# Patient Record
Sex: FEMALE | Race: WHITE | NOT HISPANIC OR LATINO | Employment: UNEMPLOYED | URBAN - METROPOLITAN AREA
[De-identification: names, ages, dates, MRNs, and addresses within clinical notes are randomized per-mention and may not be internally consistent; named-entity substitution may affect disease eponyms.]

---
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
## 2024-05-17 ENCOUNTER — PATIENT MESSAGE (OUTPATIENT)
Dept: FAMILY MEDICINE CLINIC | Facility: CLINIC | Age: 19
End: 2024-05-17

## 2024-05-29 ENCOUNTER — TELEPHONE (OUTPATIENT)
Dept: FAMILY MEDICINE CLINIC | Facility: CLINIC | Age: 19
End: 2024-05-29

## 2024-06-17 ENCOUNTER — OFFICE VISIT (OUTPATIENT)
Dept: FAMILY MEDICINE CLINIC | Facility: CLINIC | Age: 19
End: 2024-06-17
Payer: COMMERCIAL

## 2024-06-17 VITALS
BODY MASS INDEX: 29.71 KG/M2 | OXYGEN SATURATION: 97 % | TEMPERATURE: 98.4 F | SYSTOLIC BLOOD PRESSURE: 108 MMHG | DIASTOLIC BLOOD PRESSURE: 64 MMHG | HEART RATE: 89 BPM | WEIGHT: 174 LBS | HEIGHT: 64 IN

## 2024-06-17 DIAGNOSIS — F41.9 ANXIETY: ICD-10-CM

## 2024-06-17 DIAGNOSIS — Z13.0 SCREENING FOR DEFICIENCY ANEMIA: ICD-10-CM

## 2024-06-17 DIAGNOSIS — Z13.29 SCREENING FOR THYROID DISORDER: ICD-10-CM

## 2024-06-17 DIAGNOSIS — R35.89 POLYURIA: ICD-10-CM

## 2024-06-17 DIAGNOSIS — Z11.59 ENCOUNTER FOR HEPATITIS C SCREENING TEST FOR LOW RISK PATIENT: ICD-10-CM

## 2024-06-17 DIAGNOSIS — Z13.220 SCREENING FOR LIPID DISORDERS: ICD-10-CM

## 2024-06-17 DIAGNOSIS — Z00.00 ANNUAL PHYSICAL EXAM: Primary | ICD-10-CM

## 2024-06-17 PROCEDURE — 99395 PREV VISIT EST AGE 18-39: CPT | Performed by: NURSE PRACTITIONER

## 2024-06-17 RX ORDER — ESCITALOPRAM OXALATE 5 MG/1
15 TABLET ORAL DAILY
Qty: 30 TABLET | Refills: 0 | Status: SHIPPED | OUTPATIENT
Start: 2024-06-17

## 2024-06-17 NOTE — ASSESSMENT & PLAN NOTE
Follows regularly with Samuel  Has anxiety slightly worsened  Continues on Lexapro 15mg daily  Encouraged to follow up due to panic attacks  She is working with a therapist

## 2024-06-17 NOTE — PROGRESS NOTES
Adult Annual Physical  Name: Antonietta Mg      : 2005      MRN: [id number]  Encounter Provider: KATH Mead  Encounter Date: 2024   Encounter department: [facility]    Assessment & Plan   1. Annual physical exam  2. Screening for lipid disorders  -     Lipid Panel with Direct LDL reflex; Future  -     Lipid Panel with Direct LDL reflex  3. Screening for thyroid disorder  -     TSH, 3rd generation with Free T4 reflex; Future  -     TSH, 3rd generation with Free T4 reflex  4. Screening for deficiency anemia  -     Comprehensive metabolic panel; Future  -     CBC and differential; Future  -     Comprehensive metabolic panel  -     CBC and differential  5. Anxiety  Assessment & Plan:  Follows regularly with Samuel  Has anxiety slightly worsened  Continues on Lexapro 15mg daily  Encouraged to follow up due to panic attacks  She is working with a therapist  Orders:  -     escitalopram (LEXAPRO) 5 mg tablet; Take 3 tablets (15 mg total) by mouth daily  6. Encounter for hepatitis C screening test for low risk patient  -     Hepatitis C RNA, quantitative, PCR; Future  -     Hepatitis C RNA, quantitative, PCR  7. Polyuria  -     Hemoglobin A1c (w/out EAG); Future  -     Hemoglobin A1c (w/out EAG)    Immunizations and preventive care screenings were discussed with patient today. Appropriate education was printed on patient's after visit summary.    Counseling:  Alcohol/drug use: discussed moderation in alcohol intake, the recommendations for healthy alcohol use, and avoidance of illicit drug use.  Dental Health: discussed importance of regular tooth brushing, flossing, and dental visits.  Injury prevention: discussed safety/seat belts, safety helmets, smoke detectors, carbon dioxide detectors, and smoking near bedding or upholstery.  Sexual health: discussed sexually transmitted diseases, partner selection, use of condoms, avoidance of unintended pregnancy, and contraceptive  alternatives.  Exercise: the importance of regular exercise/physical activity was discussed. Recommend exercise 3-5 times per week for at least 30 minutes.          History of Present Illness   {Disappearing Hyperlinks I Encounters * My Last Note * Since Last Visit * History :91151}  Adult Annual Physical:  Patient presents for annual physical. Urinary frequency- this has been ongoing since 2020  She does state she drinks a lot of water- drinks approximately 60oz daily sometimes more sometimes less.  Doesn't drink a lot of caffeine- cut down on this due to anxiety  .     Diet and Physical Activity:    - Exercise:. few times per week cardio and strength training    Depression Screening:  - PHQ-2 Score: 0  - PHQ-9 Score: 0    General Health:  - Sleep:. slightly worse due to anxiety but overall doing well  - Hearing: normal hearing right ear and normal hearing left ear.  - Vision: vision problems, wears glasses and most recent eye exam < 1 year ago.  - Dental: no dental visits for > 1 year.    /GYN Health:  - Follows with GYN: yes.   - Contraception: oral contraceptives.      Review of Systems   Constitutional: Negative.  Negative for chills and fever.   HENT:  Positive for sore throat. Negative for ear pain.    Eyes:  Positive for visual disturbance (wears glasses). Negative for pain.   Respiratory:  Negative for cough and shortness of breath.    Cardiovascular:  Negative for chest pain and palpitations.   Gastrointestinal:  Negative for abdominal pain and vomiting.   Endocrine: Positive for polyphagia and polyuria.   Genitourinary:  Negative for dysuria and hematuria.   Musculoskeletal:  Negative for arthralgias and back pain.   Skin:  Negative for color change and rash.   Neurological:  Negative for seizures and syncope.   Psychiatric/Behavioral:  Negative for suicidal ideas. The patient is nervous/anxious.    All other systems reviewed and are negative.        Objective   {Disappearing Hyperlinks   Review Vitals  "* Enter New Vitals * Results Review * Labs * Imaging * Cardiology * Procedures * Lung Cancer Screening :21815}  /64 (BP Location: Left arm, Patient Position: Sitting, Cuff Size: Standard)   Pulse 89   Temp 98.4 °F (36.9 °C) (Tympanic)   Ht 5' 4\" (1.626 m)   Wt 78.9 kg (174 lb)   SpO2 97%   BMI 29.87 kg/m²     Physical Exam  Vitals and nursing note reviewed.   Constitutional:       General: She is not in acute distress.     Appearance: Normal appearance. She is well-developed.   HENT:      Head: Normocephalic and atraumatic.      Right Ear: Tympanic membrane, ear canal and external ear normal.      Left Ear: Tympanic membrane, ear canal and external ear normal.      Nose: Nose normal.      Mouth/Throat:      Mouth: Mucous membranes are moist.      Pharynx: Oropharynx is clear. No posterior oropharyngeal erythema.   Eyes:      Conjunctiva/sclera: Conjunctivae normal.      Pupils: Pupils are equal, round, and reactive to light.   Neck:      Thyroid: No thyromegaly or thyroid tenderness.   Cardiovascular:      Rate and Rhythm: Normal rate and regular rhythm.      Pulses:           Radial pulses are 2+ on the right side and 2+ on the left side.      Heart sounds: Normal heart sounds. No murmur heard.  Pulmonary:      Effort: Pulmonary effort is normal. No respiratory distress.      Breath sounds: Normal breath sounds.   Abdominal:      General: Bowel sounds are normal.      Palpations: Abdomen is soft.      Tenderness: There is no abdominal tenderness.   Musculoskeletal:      Cervical back: Neck supple.      Right lower leg: No edema.      Left lower leg: No edema.   Lymphadenopathy:      Cervical: No cervical adenopathy.   Skin:     General: Skin is warm and dry.   Neurological:      Mental Status: She is alert and oriented to person, place, and time.   Psychiatric:         Mood and Affect: Mood normal.         Behavior: Behavior normal.       Administrative Statements {Disappearing Hyperlinks I  Level of " Service * PCM/PCSP:84832}

## 2024-06-18 ENCOUNTER — CLINICAL SUPPORT (OUTPATIENT)
Dept: FAMILY MEDICINE CLINIC | Facility: CLINIC | Age: 19
End: 2024-06-18

## 2024-06-18 ENCOUNTER — TELEPHONE (OUTPATIENT)
Dept: FAMILY MEDICINE CLINIC | Facility: CLINIC | Age: 19
End: 2024-06-18

## 2024-06-18 DIAGNOSIS — J02.9 SORE THROAT: Primary | ICD-10-CM

## 2024-06-18 NOTE — TELEPHONE ENCOUNTER
You can schedule her for a nurse visit throat swab-throat culture. We will get results in 48 hours. In meantime, fluids, rest, salt water gargles, treat symptoms as we discussed yesterday. There are a lot of viruses going around right now.

## 2024-06-18 NOTE — TELEPHONE ENCOUNTER
Pt's mom stopped in today and states that she is sick. States that she did not say anything yesterday because it was her physical but she has had a sore throat and today has had a low-grade fever and is very fatigued.    Mom is wondering if there is anything that we can do for her or if she should come back in to be swabbed for strep maybe.    Please advise.

## 2024-06-21 ENCOUNTER — APPOINTMENT (EMERGENCY)
Dept: RADIOLOGY | Facility: HOSPITAL | Age: 19
End: 2024-06-21
Payer: COMMERCIAL

## 2024-06-21 ENCOUNTER — HOSPITAL ENCOUNTER (EMERGENCY)
Facility: HOSPITAL | Age: 19
Discharge: HOME | End: 2024-06-21
Attending: EMERGENCY MEDICINE
Payer: COMMERCIAL

## 2024-06-21 VITALS
TEMPERATURE: 98.4 F | OXYGEN SATURATION: 98 % | SYSTOLIC BLOOD PRESSURE: 146 MMHG | RESPIRATION RATE: 20 BRPM | DIASTOLIC BLOOD PRESSURE: 90 MMHG | HEART RATE: 88 BPM

## 2024-06-21 DIAGNOSIS — J20.9 ACUTE BRONCHITIS, UNSPECIFIED ORGANISM: Primary | ICD-10-CM

## 2024-06-21 LAB
B-HEM STREP SPEC QL CULT: NEGATIVE
EXT PREGNANCY TEST URINE: NEGATIVE
EXT. CONTROL: NORMAL

## 2024-06-21 PROCEDURE — 99284 EMERGENCY DEPT VISIT MOD MDM: CPT

## 2024-06-21 PROCEDURE — 71045 X-RAY EXAM CHEST 1 VIEW: CPT

## 2024-06-21 PROCEDURE — 81025 URINE PREGNANCY TEST: CPT

## 2024-06-21 RX ORDER — BENZONATATE 100 MG/1
100 CAPSULE ORAL EVERY 8 HOURS
Qty: 15 CAPSULE | Refills: 0 | Status: SHIPPED | OUTPATIENT
Start: 2024-06-21 | End: 2024-06-26

## 2024-06-21 NOTE — Clinical Note
Antonietta Mg was seen and treated in our emergency department on 6/21/2024.            May return to work earlier if feels better    Diagnosis: Acute bronchitis    Antonietta  may return to work on return date.    She may return on this date: 06/23/2024         If you have any questions or concerns, please don't hesitate to call.      Gabrielle Ocasio PA-C    ______________________________           _______________          _______________  Hospital Representative                              Date                                Time

## 2024-06-22 NOTE — ED PROVIDER NOTES
History  Chief Complaint   Patient presents with    Cough     Pt c/o cough, sore throat, sinus congestion onset Monday; recent negative throat culture. Pt admits to recent vape use     Patient is a 19-year-old female presenting to the emergency department with mother for an evaluation of productive cough, sore throat, sinus congestion x 5 days. Patient reports no fever or systemic symptoms.  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 Also reports increased vape use.  Does report history of bronchitis in the past, denies history of pneumonia.      Cough  Associated symptoms: chest pain (With coughing, not at rest), rhinorrhea and sore throat    Associated symptoms: no chills, no fever, no headaches, no rash and no shortness of breath        Prior to Admission Medications   Prescriptions Last Dose Informant Patient Reported? Taking?   Norethin-Eth Estrad-Fe Biphas (Lo Loestrin Fe) 1 MG-10 MCG / 10 MCG TABS   Yes No   Sig: Take by mouth in the morning   escitalopram (LEXAPRO) 5 mg tablet   No No   Sig: Take 3 tablets (15 mg total) by mouth daily      Facility-Administered Medications: None       History reviewed. No pertinent past medical history.    History reviewed. No pertinent surgical history.    History reviewed. No pertinent family history.  I have reviewed and agree with the history as documented.    E-Cigarette/Vaping    E-Cigarette Use Current Some Day User      E-Cigarette/Vaping Substances     Social History     Tobacco Use    Smoking status: Never    Smokeless tobacco: Never   Vaping Use    Vaping status: Some Days   Substance Use Topics    Alcohol use: Not Currently    Drug use: Not Currently       Review of Systems   Constitutional:  Negative for chills and fever.   HENT:  Positive for congestion, rhinorrhea and sore throat.    Eyes:  Negative for itching.   Respiratory:  Positive for cough. Negative for choking, chest  tightness and shortness of breath.    Cardiovascular:  Positive for chest pain (With coughing, not at rest). Negative for palpitations.   Gastrointestinal:  Negative for abdominal pain, nausea and vomiting.   Genitourinary:  Negative for dysuria and hematuria.   Musculoskeletal:  Negative for arthralgias, back pain, neck pain and neck stiffness.   Skin:  Negative for color change and rash.   Neurological:  Negative for dizziness, syncope, weakness, light-headedness and headaches.   All other systems reviewed and are negative.      Physical Exam  Physical Exam  Vitals and nursing note reviewed.   Constitutional:       General: She is not in acute distress.     Appearance: Normal appearance. She is well-developed. She is not ill-appearing or toxic-appearing.   HENT:      Head: Normocephalic and atraumatic.      Mouth/Throat:      Mouth: Mucous membranes are moist.      Pharynx: No oropharyngeal exudate or posterior oropharyngeal erythema.   Eyes:      General:         Right eye: No discharge.         Left eye: No discharge.      Conjunctiva/sclera: Conjunctivae normal.   Cardiovascular:      Rate and Rhythm: Normal rate and regular rhythm.   Pulmonary:      Effort: Pulmonary effort is normal. No respiratory distress.      Breath sounds: Normal breath sounds. No stridor. No wheezing, rhonchi or rales.   Chest:      Chest wall: Tenderness (Very mild tenderness on palpation over the manubrium likely secondary to increased cough) present.   Abdominal:      General: Abdomen is flat. There is no distension.      Palpations: Abdomen is soft.      Tenderness: There is no abdominal tenderness.   Musculoskeletal:         General: Normal range of motion.      Cervical back: Normal range of motion and neck supple. No rigidity.   Skin:     General: Skin is warm and dry.      Capillary Refill: Capillary refill takes less than 2 seconds.   Neurological:      Mental Status: She is alert.   Psychiatric:         Mood and Affect: Mood  normal.         Vital Signs  ED Triage Vitals   Temperature Pulse Respirations Blood Pressure SpO2   06/21/24 2138 06/21/24 2138 06/21/24 2138 06/21/24 2138 06/21/24 2138   98.4 °F (36.9 °C) 92 20 146/90 98 %      Temp Source Heart Rate Source Patient Position - Orthostatic VS BP Location FiO2 (%)   06/21/24 2138 06/21/24 2230 06/21/24 2138 06/21/24 2138 --   Oral Monitor Sitting Left arm       Pain Score       --                  Vitals:    06/21/24 2138 06/21/24 2230   BP: 146/90    Pulse: 92 88   Patient Position - Orthostatic VS: Sitting          Visual Acuity      ED Medications  Medications - No data to display    Diagnostic Studies  Results Reviewed       Procedure Component Value Units Date/Time    POCT pregnancy, urine [[id number]]  (Normal) Resulted: 06/21/24 2227    Lab Status: Final result Updated: 06/21/24 2227     EXT Preg Test, Ur Negative     Control Valid                   XR chest 1 view portable   ED Interpretation by Gabrielle Ocasio PA-C (06/21 2235)   No acute cardiopulmonary disease on my interpretation.      Final Result by Karina Royal MD (06/21 2326)      No acute cardiopulmonary disease.            Workstation performed: [id number]                    Procedures  Procedures         ED Course  ED Course as of 06/22/24 0002 Fri Jun 21, 2024 2220 Patient denies chance of pregnancy   2235 PREGNANCY TEST URINE: Negative                                             Medical Decision Making  Patient in no acute respiratory distress, SpO2 98% on RA.  Not tachypneic.  Differential diagnosis includes but is not limited to, acute bronchitis, viral syndrome, acute cough, unlikely for pneumonia due to the lack of systemic symptoms or findings on physical exam however will evaluate with chest x-ray. Findings discussed with patient and mother at bedside, all questions answered, will discharge with Tessalon Perles. Other supportive measures discussed.  Return precautions discussed as well as written  down in the AVS with full understanding.  Patient discharged in no acute respiratory distress and with stable vital signs.    Amount and/or Complexity of Data Reviewed  Labs: ordered. Decision-making details documented in ED Course.  Radiology: ordered and independent interpretation performed.    Risk  Prescription drug management.             Disposition  Final diagnoses:   Acute bronchitis, unspecified organism     Time reflects when diagnosis was documented in both MDM as applicable and the Disposition within this note       Time User Action Codes Description Comment    6/21/2024 10:37 PM Gabrielle Ocasio Add [J20.9] Acute bronchitis, unspecified organism           ED Disposition       ED Disposition   Discharge    Condition   Stable    Date/Time   Fri Jun 21, 2024 10:38 PM    Comment   Antonietta Mg discharge to home/self care.                   Follow-up Information       Follow up With Specialties Details Why Contact Info Additional Information    [facility], Nurse Practitioner Schedule an appointment as soon as possible for a visit in 1 day  [address] [facility] [address] [phone]        [facility] Emergency Department Emergency Medicine Go to  If symptoms worsen, As needed [address] [facility] [address] [phone] [facility] Emergency Department, [address] [place], [place] [address]            Discharge Medication List as of 6/21/2024 10:42 PM        START taking these medications    Details   benzonatate (TESSALON PERLES) 100 mg capsule Take 1 capsule (100 mg total) by mouth every 8 (eight) hours for 5 days, Starting Fri 6/21/2024, Until Wed 6/26/2024, Normal           CONTINUE these medications which have NOT CHANGED    Details   escitalopram (LEXAPRO) 5 mg tablet Take 3 tablets (15 mg total) by mouth daily, Starting Mon 6/17/2024, Fill Later      Norethin-Eth  Estrad-Fe Biphas (Lo Loestrin Fe) 1 MG-10 MCG / 10 MCG TABS Take by mouth in the morning, Historical Med             No discharge procedures on file.    PDMP Review       None            ED Provider  Electronically Signed by             Gabrielle Ocasio PA-C  06/21/24 0302       Gabrielle Ocasio PA-C  06/22/24 0002

## 2024-06-22 NOTE — DISCHARGE INSTRUCTIONS
Please take the medication that was sent to your pharmacy as directed.  You may use Tylenol as needed for the fever and discomfort.  You may also use over-the-counter saline nasal spray for nasal congestion relief.  Please schedule a follow-up appointment with primary care provider in 1 week for reevaluation of symptoms and recovery progress.  Please ensure you are getting adequate rest and you may return to normal activities as tolerated.  Please avoid exposures to irritants such as smoke or strong odors.    Return to the Emergency Department if you experience worsening or uncontrolled chest pain, shortness of breath, light headedness, feeling faint, nausea, vomiting, or any other concerning symptoms.

## 2024-07-02 LAB
ALBUMIN SERPL-MCNC: 4.2 G/DL (ref 4–5)
ALP SERPL-CCNC: 68 IU/L (ref 42–106)
ALT SERPL-CCNC: 8 IU/L (ref 0–32)
AST SERPL-CCNC: 13 IU/L (ref 0–40)
BASOPHILS # BLD AUTO: 0.1 X10E3/UL (ref 0–0.2)
BASOPHILS NFR BLD AUTO: 0 %
BILIRUB SERPL-MCNC: <0.2 MG/DL (ref 0–1.2)
BUN SERPL-MCNC: 12 MG/DL (ref 6–20)
BUN/CREAT SERPL: 18 (ref 9–23)
CALCIUM SERPL-MCNC: 9.3 MG/DL (ref 8.7–10.2)
CHLORIDE SERPL-SCNC: 102 MMOL/L (ref 96–106)
CHOLEST SERPL-MCNC: 178 MG/DL (ref 100–199)
CO2 SERPL-SCNC: 22 MMOL/L (ref 20–29)
CREAT SERPL-MCNC: 0.67 MG/DL (ref 0.57–1)
EGFR: 128 ML/MIN/1.73
EOSINOPHIL # BLD AUTO: 0.1 X10E3/UL (ref 0–0.4)
EOSINOPHIL NFR BLD AUTO: 1 %
ERYTHROCYTE [DISTWIDTH] IN BLOOD BY AUTOMATED COUNT: 11.8 % (ref 11.7–15.4)
GLOBULIN SER-MCNC: 2.7 G/DL (ref 1.5–4.5)
GLUCOSE SERPL-MCNC: 81 MG/DL (ref 70–99)
HBA1C MFR BLD: 5.1 % (ref 4.8–5.6)
HCT VFR BLD AUTO: 35.5 % (ref 34–46.6)
HCV RNA SERPL NAA+PROBE-ACNC: NORMAL IU/ML
HDLC SERPL-MCNC: 70 MG/DL
HGB BLD-MCNC: 11.9 G/DL (ref 11.1–15.9)
IMM GRANULOCYTES # BLD: 0.1 X10E3/UL (ref 0–0.1)
IMM GRANULOCYTES NFR BLD: 1 %
LDLC SERPL CALC-MCNC: 91 MG/DL (ref 0–99)
LDLC/HDLC SERPL: 1.3 RATIO (ref 0–3.2)
LYMPHOCYTES # BLD AUTO: 2.1 X10E3/UL (ref 0.7–3.1)
LYMPHOCYTES NFR BLD AUTO: 19 %
MCH RBC QN AUTO: 31.5 PG (ref 26.6–33)
MCHC RBC AUTO-ENTMCNC: 33.5 G/DL (ref 31.5–35.7)
MCV RBC AUTO: 94 FL (ref 79–97)
MONOCYTES # BLD AUTO: 0.7 X10E3/UL (ref 0.1–0.9)
MONOCYTES NFR BLD AUTO: 6 %
NEUTROPHILS # BLD AUTO: 8.4 X10E3/UL (ref 1.4–7)
NEUTROPHILS NFR BLD AUTO: 73 %
PLATELET # BLD AUTO: 379 X10E3/UL (ref 150–450)
POTASSIUM SERPL-SCNC: 4.3 MMOL/L (ref 3.5–5.2)
PROT SERPL-MCNC: 6.9 G/DL (ref 6–8.5)
RBC # BLD AUTO: 3.78 X10E6/UL (ref 3.77–5.28)
SL AMB VLDL CHOLESTEROL CALC: 17 MG/DL (ref 5–40)
SODIUM SERPL-SCNC: 137 MMOL/L (ref 134–144)
TEST INFORMATION: NORMAL
TRIGL SERPL-MCNC: 93 MG/DL (ref 0–149)
TSH SERPL DL<=0.005 MIU/L-ACNC: 1.45 UIU/ML (ref 0.45–4.5)
WBC # BLD AUTO: 11.4 X10E3/UL (ref 3.4–10.8)

## 2024-07-03 ENCOUNTER — OFFICE VISIT (OUTPATIENT)
Dept: FAMILY MEDICINE CLINIC | Facility: CLINIC | Age: 19
End: 2024-07-03
Payer: COMMERCIAL

## 2024-07-03 VITALS
TEMPERATURE: 96.8 F | BODY MASS INDEX: 30.05 KG/M2 | DIASTOLIC BLOOD PRESSURE: 60 MMHG | WEIGHT: 176 LBS | HEIGHT: 64 IN | HEART RATE: 80 BPM | OXYGEN SATURATION: 97 % | SYSTOLIC BLOOD PRESSURE: 110 MMHG

## 2024-07-03 DIAGNOSIS — J01.00 ACUTE NON-RECURRENT MAXILLARY SINUSITIS: Primary | ICD-10-CM

## 2024-07-03 PROCEDURE — 99213 OFFICE O/P EST LOW 20 MIN: CPT | Performed by: NURSE PRACTITIONER

## 2024-07-03 RX ORDER — CEFUROXIME AXETIL 250 MG/1
250 TABLET ORAL EVERY 12 HOURS SCHEDULED
Qty: 20 TABLET | Refills: 0 | Status: SHIPPED | OUTPATIENT
Start: 2024-07-03 | End: 2024-07-13

## 2024-07-03 NOTE — PATIENT INSTRUCTIONS
Ceftin twice daily for 10 days, take with food  Increase fluids  Nasal saline spray  Steamy showers  Sleep with head of bed elevated    Recommend probiotic like align   If you develop yeast infection call us- we can send in diflucan

## 2024-07-03 NOTE — PROGRESS NOTES
Ambulatory Visit  Name: Antonietta Mg      : 2005      MRN: [id number]  Encounter Provider: KATH Mead  Encounter Date: 7/3/2024   Encounter department: [facility]    Assessment & Plan   1. Acute non-recurrent maxillary sinusitis  Assessment & Plan:  Ceftin twice daily for 10 days, take with food  Increase fluids  Nasal saline spray  Steamy showers  Sleep with head of bed elevated    Recommend probiotic like align   If you develop yeast infection call us- we can send in diflucan  Orders:  -     cefuroxime (CEFTIN) 250 mg tablet; Take 1 tablet (250 mg total) by mouth every 12 (twelve) hours for 10 days     History of Present Illness   {Disappearing Hyperlinks I Encounters * My Last Note * Since Last Visit * History :79201}  Here today for ER follow up. Started on 24 with sore throat, fatigue, strep was negative. Went to ER on 24 with productive cough, sore throat, sinus congestion. No fever. CXR was negative. Was given tessalon perles and recommended supportive measures. Continues with cough and sinus congestion. Has been taking mucinex not helping with sinuses. Tessalon helps with cough. Wakes up with a lot of mucous production and bloody noses, brining up thick green mucous with cough as well. Cough is improving during the day, morning and night are problematic.     Had labs drawn 24: CMP, Lipid, TSH, Hep C and A1c were all normal, CBC was normal except WBC 11.4, neutrophils 8.4           Review of Systems   Constitutional:  Positive for fatigue. Negative for chills and fever.   HENT:  Positive for congestion, postnasal drip, rhinorrhea, sinus pressure, sinus pain and sore throat. Negative for ear discharge, ear pain, hearing loss and sneezing.    Eyes:  Negative for pain, discharge and itching.   Respiratory:  Positive for cough. Negative for chest tightness, shortness of breath and wheezing.    Cardiovascular:  Negative for chest pain, palpitations and  "leg swelling.   Gastrointestinal:  Negative for abdominal pain, diarrhea, nausea and vomiting.   Genitourinary:  Negative for dysuria.   Musculoskeletal:  Positive for myalgias.   Skin:  Negative for rash.   Allergic/Immunologic: Positive for environmental allergies.   Neurological:  Positive for headaches. Negative for dizziness and light-headedness.   Hematological:  Negative for adenopathy.       Objective   {Disappearing Hyperlinks   Review Vitals * Enter New Vitals * Results Review * Labs * Imaging * Cardiology * Procedures * Lung Cancer Screening :86442}  /60   Pulse 80   Temp (!) 96.8 °F (36 °C) (Tympanic)   Ht 5' 4\" (1.626 m)   Wt 79.8 kg (176 lb)   SpO2 97%   BMI 30.21 kg/m²     Physical Exam  Vitals and nursing note reviewed.   Constitutional:       General: She is not in acute distress.     Appearance: Normal appearance. She is well-developed.   HENT:      Head: Normocephalic and atraumatic.      Right Ear: Tympanic membrane, ear canal and external ear normal.      Left Ear: Tympanic membrane, ear canal and external ear normal.      Nose: Congestion present.      Mouth/Throat:      Mouth: Mucous membranes are moist.      Pharynx: Oropharynx is clear. No posterior oropharyngeal erythema.   Eyes:      Conjunctiva/sclera: Conjunctivae normal.      Pupils: Pupils are equal, round, and reactive to light.   Neck:      Thyroid: No thyromegaly or thyroid tenderness.   Cardiovascular:      Rate and Rhythm: Normal rate and regular rhythm.      Pulses:           Radial pulses are 2+ on the right side and 2+ on the left side.      Heart sounds: Normal heart sounds. No murmur heard.  Pulmonary:      Effort: Pulmonary effort is normal. No respiratory distress.      Breath sounds: Normal breath sounds. No rhonchi.   Abdominal:      General: Bowel sounds are normal.      Palpations: Abdomen is soft.      Tenderness: There is no abdominal tenderness.   Musculoskeletal:      Cervical back: Neck supple.      " Right lower leg: No edema.      Left lower leg: No edema.   Lymphadenopathy:      Cervical: No cervical adenopathy.   Skin:     General: Skin is warm and dry.   Neurological:      Mental Status: She is alert and oriented to person, place, and time.   Psychiatric:         Mood and Affect: Mood normal.         Behavior: Behavior normal.       Administrative Statements {Disappearing Hyperlinks I  [id number]}

## 2024-08-02 PROBLEM — J01.00 ACUTE NON-RECURRENT MAXILLARY SINUSITIS: Status: RESOLVED | Noted: 2024-07-03 | Resolved: 2024-08-02

## 2024-11-20 ENCOUNTER — TELEPHONE (OUTPATIENT)
Age: 19
End: 2024-11-20

## 2024-11-20 ENCOUNTER — TRANSITIONAL CARE MANAGEMENT (OUTPATIENT)
Dept: FAMILY MEDICINE CLINIC | Facility: CLINIC | Age: 19
End: 2024-11-20

## 2024-11-20 NOTE — TELEPHONE ENCOUNTER
Patient mother called to schedule a TCM for patient who was release from and overnight sty at [facility], but she needed to double check patient schedule. Please reach out to patient's mom to schedule TCM for patient.

## 2024-11-20 NOTE — TELEPHONE ENCOUNTER
Spoke to Pts mother and made TCM appt for  Friday 11/22/24   She was discharged from [facility] on Monday nov.18th for exposure to cdiff

## 2024-11-22 ENCOUNTER — OFFICE VISIT (OUTPATIENT)
Dept: FAMILY MEDICINE CLINIC | Facility: CLINIC | Age: 19
End: 2024-11-22
Payer: COMMERCIAL

## 2024-11-22 VITALS
HEART RATE: 97 BPM | OXYGEN SATURATION: 97 % | BODY MASS INDEX: 31.41 KG/M2 | HEIGHT: 64 IN | SYSTOLIC BLOOD PRESSURE: 122 MMHG | DIASTOLIC BLOOD PRESSURE: 64 MMHG | TEMPERATURE: 97.7 F | WEIGHT: 184 LBS

## 2024-11-22 DIAGNOSIS — R19.7 DIARRHEA OF PRESUMED INFECTIOUS ORIGIN: Primary | ICD-10-CM

## 2024-11-22 PROBLEM — H47.339 PSEUDOPAPILLEDEMA: Status: ACTIVE | Noted: 2017-02-08

## 2024-11-22 PROCEDURE — 99496 TRANSJ CARE MGMT HIGH F2F 7D: CPT | Performed by: FAMILY MEDICINE

## 2024-11-22 RX ORDER — CIPROFLOXACIN 250 MG/1
250 TABLET, FILM COATED ORAL 2 TIMES DAILY
COMMUNITY
Start: 2024-11-19 | End: 2024-11-24

## 2024-11-22 NOTE — PATIENT INSTRUCTIONS
Finish cipro  Light diet   Colace up to twice a day as needed   1/2 cap miralax if needed   Call if Fever. Blood in stool worsening pain left lower abdomen.

## 2024-11-22 NOTE — PROGRESS NOTES
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   Friday exposed and Saturday afternoon started and  am. Pure liquid stool and no blood-    am tested. Monday am tested again   Now with constipation.- bland foods- rice, applesauce.   Just have coffee.       Review of Systems   Constitutional: Negative.  Negative for fatigue and fever.   HENT: Negative.     Eyes: Negative.    Respiratory: Negative.  Negative for cough.    Cardiovascular: Negative.    Gastrointestinal:  Positive for diarrhea.   Endocrine: Negative.    Genitourinary: Negative.    Musculoskeletal: Negative.    Skin: Negative.    Allergic/Immunologic: Negative.    Neurological: Negative.    Psychiatric/Behavioral: Negative.            Objective   /64   Pulse 97   Temp 97.7 °F (36.5 °C) (Tympanic)   Ht 5' 4\" (1.626 m)   Wt 83.5 kg (184 lb)   SpO2 97%   BMI 31.58 kg/m²      Physical Exam  Vitals and nursing note reviewed.   Constitutional:       Appearance: She is well-developed.   HENT:      Head: Normocephalic and atraumatic.   Cardiovascular:      Rate and Rhythm: Normal rate and regular rhythm.      Heart sounds: Normal heart sounds.   Pulmonary:      Effort: Pulmonary effort is normal.      Breath sounds: Normal breath sounds.   Abdominal:      General: Bowel sounds are normal.      Palpations: Abdomen is soft.   Skin:     General: Skin is warm and dry.   Neurological:      Mental Status: She is alert and oriented to person, place, and time.   Psychiatric:         Behavior: Behavior normal.         Thought Content: Thought content normal.         Judgment: Judgment normal.         "

## 2024-11-25 NOTE — ASSESSMENT & PLAN NOTE
Stool cultures were negative. No blood, no fever. And now diarrhea changed to constipation. Observe for continued improvement.

## 2024-11-25 NOTE — PROGRESS NOTES
"Transition of Care Visit  Name: Antonietta Mg      : 2005      MRN: [id number]  Encounter Provider: Jackelyn Cohen DO  Encounter Date: 2024   Encounter department: [facility]    Assessment & Plan  Diarrhea of presumed infectious origin  Stool cultures were negative. No blood, no fever. And now diarrhea changed to constipation. Observe for continued improvement.               History of Present Illness     Transitional Care Management Review:   Antonietta Mg is a 19 y.o. female here for TCM follow up.     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   Friday exposed and Saturday afternoon started with diarrhea and by  am- symptoms worse with Pure liquid stool and no blood-    am tested. Monday am tested again for Cdiff  Now with constipation.- eating bland foods- rice, applesauce.   Just had coffee for the first time        Review of Systems   Constitutional:  Positive for fatigue. Negative for fever.   HENT: Negative.     Eyes: Negative.    Respiratory: Negative.  Negative for cough.    Cardiovascular: Negative.    Gastrointestinal:  Positive for constipation and diarrhea.   Endocrine: Negative.    Genitourinary: Negative.    Musculoskeletal: Negative.    Skin: Negative.    Allergic/Immunologic: Negative.    Neurological: Negative.    Psychiatric/Behavioral: Negative.       Objective   /64   Pulse 97   Temp 97.7 °F (36.5 °C) (Tympanic)   Ht 5' 4\" (1.626 m)   Wt 83.5 kg (184 lb)   SpO2 97%   BMI 31.58 kg/m² "     Physical Exam  Vitals and nursing note reviewed.   Constitutional:       Appearance: She is well-developed.   HENT:      Head: Normocephalic and atraumatic.      Right Ear: External ear normal.      Left Ear: External ear normal.      Nose: Nose normal.   Eyes:      Conjunctiva/sclera: Conjunctivae normal.      Pupils: Pupils are equal, round, and reactive to light.   Cardiovascular:      Rate and Rhythm: Normal rate and regular rhythm.      Heart sounds: Normal heart sounds.   Pulmonary:      Effort: Pulmonary effort is normal.      Breath sounds: Normal breath sounds.   Abdominal:      General: Bowel sounds are normal.      Palpations: Abdomen is soft.      Tenderness: There is abdominal tenderness.      Comments: Mild tenderness left lower quadrant   Musculoskeletal:         General: Normal range of motion.      Cervical back: Normal range of motion and neck supple.   Skin:     General: Skin is warm and dry.   Neurological:      Mental Status: She is alert and oriented to person, place, and time.   Psychiatric:         Behavior: Behavior normal.         Thought Content: Thought content normal.         Judgment: Judgment normal.       Medications have been reviewed by provider in current encounter

## 2025-05-12 ENCOUNTER — NURSE TRIAGE (OUTPATIENT)
Age: 20
End: 2025-05-12

## 2025-05-12 NOTE — TELEPHONE ENCOUNTER
"FOLLOW UP: OV 5/14/25 2:15 pm    REASON FOR CONVERSATION: Rectal Pain    SYMPTOMS: Patient would like an appointment for evaluating of rectal discomfort she has been having off and on. Today she rates it as 6/10. She has been using ice to area. She will discontinue ice and start home care measures including warm sitz bath. There is no bleeding. She has occasional constipation issues. She is using stool softeners.    DISPOSITION: See Today or Tomorrow in Office    Reason for Disposition   Patient wants to be seen    Answer Assessment - Initial Assessment Questions  1. SYMPTOM:  \"What's the main symptom you're concerned about?\" (e.g., pain, itching, swelling, rash)      Pain in rectal area   2. ONSET: \"When did the rectal pain  start?\"      Several days  3. RECTAL PAIN: \"Do you have any pain around your rectum?\" \"How bad is the pain?\"  (Scale 0-10; or none, mild, moderate, severe)      6  4. RECTAL ITCHING: \"Do you have any itching in this area?\" \"How bad is the itching?\"  (Scale 0-10; or none, mild, moderate, severe)      no  5. CONSTIPATION: \"Do you have constipation?\" If Yes, ask: \"How often do you have a bowel movement (BM)?\"  (Normal range: 3 times a day to every 3 days)  \"When was your last BM?\"        Some constipation - last night   6. CAUSE: \"What do you think is causing the anus symptoms?\"      Hemorrhoid  7. OTHER SYMPTOMS: \"Do you have any other symptoms?\"  (e.g., abdomen pain, fever, rectal bleeding, vomiting)      Slight abdominal pain when constipated   8. PREGNANCY: \"Is there any chance you are pregnant?\" \"When was your last menstrual period?\"      no    Protocols used: Rectal Symptoms-Adult-[place]    "

## 2025-05-13 ENCOUNTER — OFFICE VISIT (OUTPATIENT)
Dept: FAMILY MEDICINE CLINIC | Facility: CLINIC | Age: 20
End: 2025-05-13
Payer: COMMERCIAL

## 2025-05-13 VITALS
DIASTOLIC BLOOD PRESSURE: 74 MMHG | WEIGHT: 180 LBS | BODY MASS INDEX: 30.73 KG/M2 | TEMPERATURE: 97.8 F | HEIGHT: 64 IN | SYSTOLIC BLOOD PRESSURE: 124 MMHG | HEART RATE: 90 BPM | OXYGEN SATURATION: 98 %

## 2025-05-13 DIAGNOSIS — K64.9 HEMORRHOIDS, UNSPECIFIED HEMORRHOID TYPE: Primary | ICD-10-CM

## 2025-05-13 PROCEDURE — 99213 OFFICE O/P EST LOW 20 MIN: CPT | Performed by: FAMILY MEDICINE

## 2025-05-13 RX ORDER — HYDROCORTISONE 25 MG/G
CREAM TOPICAL 2 TIMES DAILY
Qty: 28 G | Refills: 2 | Status: SHIPPED | OUTPATIENT
Start: 2025-05-13

## 2025-05-13 RX ORDER — ESCITALOPRAM OXALATE 10 MG/1
1 TABLET ORAL DAILY
COMMUNITY
Start: 2025-04-05

## 2025-05-13 NOTE — PATIENT INSTRUCTIONS
"Patient Education     Constipation in adults   The Basics   Written by the doctors and editors at [facility]   What is constipation? -- Constipation is a common problem that makes it hard to have bowel movements. Your bowel movements might be:   Too hard   Too small   Hard to get out   Happening fewer than 3 times a week  What causes constipation? -- Constipation can be caused by:   Side effects of some medicines   Poor diet   Diseases of the digestive system (figure 1)  What other symptoms should I watch for? -- These symptoms could signal a more serious problem:   Blood in the toilet or on the toilet paper after having a bowel movement   Fever   Weight loss   Feeling weak  It could also be a sign of a problem if you have new constipation without a change in your medicines or diet, and have never had constipation in the past.  Is there anything I can do on my own to get rid of constipation? -- Yes. Try these steps:   Eat foods that have a lot of fiber. Good choices are fruits, vegetables, prune juice, and cereal (table 1).   Drink plenty of water and other fluids.   When you feel the need to go to the bathroom, go to the bathroom. Don't hold it.   Take laxatives. These are medicines that help make bowel movements easier to get out. Some are pills that you swallow. Other medicines go into the rectum. These are called \"suppositories\" or \"enemas.\"  Should I see a doctor or nurse? -- See your doctor or nurse if:   Your symptoms are new or not normal for you.   You do not have a bowel movement for a few days.   The problem comes and goes, but lasts for longer than 3 weeks.   You are in a lot of pain.   You have other symptoms that also worry you (for example, bleeding, weakness, weight loss, or fever).   Other people in your family have had colorectal cancer or inflammatory bowel disease.  Should I have any tests? -- Your doctor or nurse will decide which tests you should have based on your age, other symptoms, and " "individual situation. There are lots of tests, but you might not need any.  Here are the most common tests doctors use to find the cause of constipation:   Rectal exam - Your doctor will look at the outside of your anus. They will also use a finger to feel inside the opening.   Sigmoidoscopy or colonoscopy - For these tests, the doctor puts a thin tube into your anus. Then, they advance the tube into your large intestine. The large intestine is also called the colon. The tube has a camera attached to it, so the doctor can look inside your intestines. During these tests, the doctor can also take samples of tissue to look at under a microscope (figure 2).   X-rays, CT scan, or MRI - These create images of the inside of your body.   Manometry studies - Manometry lets the doctor measure the pressure inside of the rectum at various points. It can help the doctor find out if the muscles that control bowel movements are working right. The test also shows whether the person's rectum can feel normally.  How is constipation treated? -- It depends on what is causing your constipation. First, your doctor will ask you to try eating more fiber and drinking more water. If that doesn't help, your doctor might suggest:   Medicines that you swallow or put in your rectum   Changing the medicines you are taking for other conditions   A treatment called an \"enema\" - Enemas can be just water, or they can contain medicine to help with constipation.   Biofeedback - This is a technique that teaches you to relax your muscles so you can let go and push bowel movements out.  Can constipation be prevented? -- You can reduce your chances of getting constipation again if you:   Eat a high-fiber diet (table 1).   Drink water and other fluids during the day,   Go to the bathroom at regular times every day.  All topics are updated as new evidence becomes available and our peer review process is complete.  This topic retrieved from [employer] on: Feb " "26, 2024.  Topic 86601 Version 11.0  Release: 32.2.4 - C32.56  © 2024 UpToDate, Inc. and/or its affiliates. All rights reserved.  figure 1: Digestive system     This drawing shows the organs in the body that process food. Together, these organs are called the \"digestive system\" or \"digestive tract.\" As food travels through this system, the body absorbs nutrients and water.  Graphic 04282 Version 5.0  table 1: Amount of fiber in different foods  Food  Serving  Grams of fiber    Fruits    Apple (with skin) 1 medium apple 4.4   Banana 1 medium banana 3.1   Oranges 1 orange 3.1   Prunes 1 cup, pitted 12.4   Juices    Apple, unsweetened, with added ascorbic acid 1 cup 0.5   Grapefruit, white, canned, sweetened 1 cup 0.2   Grape, unsweetened, with added ascorbic acid 1 cup 0.5   Orange 1 cup 0.7   Vegetables    Cooked   Green beans 1 cup 4.0   Carrots 1/2 cup sliced 2.3   Peas 1 cup 8.8   Potato (baked, with skin) 1 medium potato 3.8   Raw   Cucumber (with peel) 1 cucumber 1.5   Lettuce 1 cup shredded 0.5   Tomato 1 medium tomato 1.5   Spinach 1 cup 0.7   Legumes   Baked beans, canned, no salt added 1 cup 13.9   Kidney beans, canned 1 cup 13.6   Lima beans, canned 1 cup 11.6   Lentils, boiled 1 cup 15.6   Breads, pastas, flours    Bran muffins 1 medium muffin 5.2   Oatmeal, cooked 1 cup 4.0   White bread 1 slice 0.6   Whole-wheat bread 1 slice 1.9   Pasta and rice, cooked   Macaroni 1 cup 2.5   Rice, brown 1 cup 3.5   Rice, white 1 cup 0.6   Spaghetti (regular) 1 cup 2.5   Nuts    Almonds 1/2 cup 8.7   Peanuts 1/2 cup 7.9   To learn how much fiber and other nutrients are in different foods, visit the [place] Department of Agriculture (USDA) FoodData Central website.  Graphic 16124 Version 6.0  figure 2: Colonoscopy     During a colonoscopy, you lie on your side and the doctor puts a thin tube with a camera into your anus (from behind). Then, the doctor advances the tube into the rectum and colon. The camera sends " pictures from inside your colon to a television screen.  Graphic 87151 Version 8.0  Consumer Information Use and Disclaimer   Disclaimer: This generalized information is a limited summary of diagnosis, treatment, and/or medication information. It is not meant to be comprehensive and should be used as a tool to help the user understand and/or assess potential diagnostic and treatment options. It does NOT include all information about conditions, treatments, medications, side effects, or risks that may apply to a specific patient. It is not intended to be medical advice or a substitute for the medical advice, diagnosis, or treatment of a health care provider based on the health care provider's examination and assessment of a patient's specific and unique circumstances. Patients must speak with a health care provider for complete information about their health, medical questions, and treatment options, including any risks or benefits regarding use of medications. This information does not endorse any treatments or medications as safe, effective, or approved for treating a specific patient. UpToDate, Inc. and its affiliates disclaim any warranty or liability relating to this information or the use thereof.The use of this information is governed by the Terms of Use, available at https://www.[employer].com/en/know/clinical-effectiveness-terms. 2024© UpToDate, Inc. and its affiliates and/or licensors. All rights reserved.  Copyright   © 2024 UpToDate, Inc. and/or its affiliates. All rights reserved.  Patient Education     Hemorrhoids   The Basics   Written by the doctors and editors at [facility]   What are hemorrhoids? -- Hemorrhoids are swollen veins in the rectum. They can cause itching, bleeding, and pain. Hemorrhoids are very common.  In some cases, you can see or feel hemorrhoids around the outside of the rectum. In other cases, you cannot see them because they are hidden inside the rectum (figure 1).  What are the  "symptoms of hemorrhoids? -- Hemorrhoids do not always cause symptoms. But when they do, symptoms can include:   Itching of the skin around the anus   Bleeding - Bleeding is usually painless. You might see bright red blood after using the toilet.   Pain - If a blood clot forms inside a hemorrhoid, this can cause pain. It can also cause a lump that you might be able to feel.   Swelling - Hemorrhoids can swell or dangle outside of the rectum during a bowel movement.  If you notice bleeding when you have a bowel movement, or if your bowel movements look like tar, see a doctor or nurse. Bleeding could be caused by something other than hemorrhoids, so you should have it checked out.  How can I care for myself at home? -- If you do have hemorrhoids, your doctor or nurse can suggest treatments. But there some steps that you can try on your own first.  The most important thing that you can do is try to prevent constipation and keep your bowel movements soft. You should have a bowel movement at least a few times a week. Here are some steps that you can take:   Eat lots of fruits, vegetables, and other foods with fiber (figure 2). Fiber helps to increase bowel movements.  You need 20 to 35 grams of fiber a day to keep your bowel movements regular (table 1). If you do not get enough fiber from your diet, you can take fiber supplements. These come in the form of powders, wafers, or pills. They include psyllium seed (sample brand names: Metamucil, Konsyl), methylcellulose (sample brand name: Citrucel), polycarbophil (sample brand name: FiberCon), and wheat dextrin (sample brand name: Benefiber). If you take a fiber supplement, be sure to read the label so you know how much to take. If you're not sure, ask your doctor nurse.   Drink plenty of water and other fluids. This is especially important if you take a fiber supplement.   Limit fatty foods and alcohol. These can make constipation worse.   Take medicines called \"stool " "softeners\" such as docusate sodium (sample brand names: Colace, Dulcolax). These medicines increase the number of bowel movements you have. They are safe to take and they can prevent problems later.   Take your time when having a bowel movement. But do not spend too much time on the toilet (for example, reading). Also, try not to push hard or strain when having a bowel movement.   Get regular physical activity. Even gentle forms of exercise, like walking, are good for your health.  To relieve symptoms of hemorrhoids, you can:   Soak your buttocks in 2 or 3 inches of warm water - You can do this up to 2 to 3 times a day for 10 to 15 minutes. Do not add soap, bubble bath, or anything to the water.   Try non-prescription medicines - You can find these in a pharmacy (table 2). They include creams or ointments that you rub on your anus to relieve pain, itching, and swelling. Some hemorrhoid medicines come in a capsule (called a suppository) that you put inside your rectum. Others come in a cream that comes in a bottle with a nozzle that you put inside your rectum.  Do not use medicines that have hydrocortisone (a steroid medicine) for more than a week, unless your doctor or nurse approves.  Are there other treatments for hemorrhoids? -- Yes. If you still have symptoms after trying the steps listed above, you might need treatments to destroy or remove the hemorrhoids.  One popular treatment for hemorrhoids inside the rectum is called \"rubber band ligation.\" For this treatment, the doctor ties tiny rubber bands around the hemorrhoids. A few days later the hemorrhoids shrink and stop bleeding. Doctors can also use lasers, heat, or chemicals to destroy hemorrhoids.  If none of these options works, your doctor might suggest surgery to remove or tie off the blood vessels of the hemorrhoids. Hemorrhoids on the outside of the rectum can only be removed with surgery.  When should I call my doctor or nurse? -- Call for advice " "if:   You have new or increased bleeding from your rectum.   You have tissue sticking out from your rectum that you can't push back in.   You are not able to pass bowel movements because of pain.   Your symptoms are getting worse even with home care.   You have a fever of 100.4°F (38°C) or higher.  All topics are updated as new evidence becomes available and our peer review process is complete.  This topic retrieved from [employer] on: Feb 26, 2024.  Topic 19154 Version 19.0  Release: 32.2.4 - C32.56  © 2024 UpToDate, Inc. and/or its affiliates. All rights reserved.  figure 1: Hemorrhoids     Hemorrhoids that are hidden inside the rectum are called \"internal\" hemorrhoids. You cannot see them, but they can cause symptoms. Hemorrhoids that you can see or feel are called \"external\" hemorrhoids.  Graphic 97529 Version 2.0  figure 2: Foods with fiber     Foods with a lot of fiber include prunes, apples, oranges, bananas, peas, green beans, kidney beans, cooked oatmeal, almonds, peanuts, and whole-wheat bread.  Graphic 93726 Version 1.0  table 1: Amount of fiber in different foods  Food  Serving  Grams of fiber    Fruits    Apple (with skin) 1 medium apple 4.4   Banana 1 medium banana 3.1   Oranges 1 orange 3.1   Prunes 1 cup, pitted 12.4   Juices    Apple, unsweetened, with added ascorbic acid 1 cup 0.5   Grapefruit, white, canned, sweetened 1 cup 0.2   Grape, unsweetened, with added ascorbic acid 1 cup 0.5   Orange 1 cup 0.7   Vegetables    Cooked   Green beans 1 cup 4.0   Carrots 1/2 cup sliced 2.3   Peas 1 cup 8.8   Potato (baked, with skin) 1 medium potato 3.8   Raw   Cucumber (with peel) 1 cucumber 1.5   Lettuce 1 cup shredded 0.5   Tomato 1 medium tomato 1.5   Spinach 1 cup 0.7   Legumes   Baked beans, canned, no salt added 1 cup 13.9   Kidney beans, canned 1 cup 13.6   Lima beans, canned 1 cup 11.6   Lentils, boiled 1 cup 15.6   Breads, pastas, flours    Bran muffins 1 medium muffin 5.2   Oatmeal, cooked 1 cup 4.0 "   White bread 1 slice 0.6   Whole-wheat bread 1 slice 1.9   Pasta and rice, cooked   Macaroni 1 cup 2.5   Rice, brown 1 cup 3.5   Rice, white 1 cup 0.6   Spaghetti (regular) 1 cup 2.5   Nuts    Almonds 1/2 cup 8.7   Peanuts 1/2 cup 7.9   To learn how much fiber and other nutrients are in different foods, visit the [place] Department of Agriculture ([employer]) [employer] Central website.  Graphic 01074 Version 6.0  table 2: Common at-home treatments for hemorrhoids  Type of medicine  Examples  Notes    Stool softener Docusate sodium (sample brand names: Colace, Docu, Stool Softener) Softens bowel movements  Helps avoid straining while sitting on toilet   Bulk-forming laxatives and fiber supplements Methylcellulose (sample brand names: Citrucel, Soluble Fiber Therapy)  Polycarbophil (sample brand names: FiberCon, Konsyl Fiber)  Psyllium (sample brand names: Metamucil, Konsyl)  Wheat dextrin (sample brand name: Benefiber) Prevent hard dry stools which make hemorrhoids worse  Might reduce bleeding and other hemorrhoid symptoms  Needs to be taken with plenty of water (8 glasses of water a day)  Your doctor might suggest starting with a small dose and then increasing over time   Pain relief Pramoxine rectal foam, ointment, or wipes (sample brand names: Proctofoam, Pramox) Can help with pain and itching  Area must be gently cleaned and allowed to dry before using   Medicines to dry or protect skin Witch hazel (sample brand names: Tucks, Preparation H pads, Preparation H wipes)  Zinc oxide topical paste (sample brand names: Janet's Butt Paste, Desitin) May dry or tighten skin around the anus  Zinc oxide also protects skin from irritation  Area must be gently cleaned and allowed to dry before using  Can apply after a sitz bath (soaking in warm, shallow water)  Witch hazel wipes or unscented baby wipes can be used to clean the anus after a bowel movement   Steroid cream Hydrocortisone rectal cream (sample brand name:  Preparation H hydrocortisone) Reduces swelling, and pain caused by hemorrhoids  Do not use for longer than a week  Do not use at all if you are pregnant unless your doctor or nurse tells you to   Medicines to shrink hemorrhoids Phenylephrine ointment or suppository (sample brand names: Preparation H, Rectacaine) Phenylephrine shrinks swollen hemorrhoids, and relieves itching and discomfort for a few hours  Area must be gently cleaned and allowed to dry before applying   Numbing ointments Benzocaine rectal ointment (sample brand name: Americaine)  Dibucaine rectal ointment (sample brand name: Nupercainal)  Lidocaine rectal cream (sample brand name: RectiCare) Benzocaine, dibucaine, and lidocaine can help with pain and itching, but should not be used without talking to a doctor first. They should only be used once in a while, in small amounts.   This table lists some examples of over-the-counter treatments for hemorrhoids. There are many more brand-name and generic versions available, too. Read all labels to make sure you're not using too much of one ingredient.  Do not use over-the-counter treatments for more than one week without speaking to your doctor. They can damage your skin.  Follow instructions carefully - for example, it's important to clean and dry your skin before using creams or ointments. You can use an unscented baby wipe to clean your anus after a bowel movement.  If you have rectal bleeding, or if your bowel movements look like tar, see your doctor or nurse. These problems could be caused by something other than hemorrhoids. You should also see your doctor or nurse if your hemorrhoid symptoms still bother you after you have tried taking care of them yourself.  Graphic 767029 Version 7.0  Consumer Information Use and Disclaimer   Disclaimer: This generalized information is a limited summary of diagnosis, treatment, and/or medication information. It is not meant to be comprehensive and should be used as  a tool to help the user understand and/or assess potential diagnostic and treatment options. It does NOT include all information about conditions, treatments, medications, side effects, or risks that may apply to a specific patient. It is not intended to be medical advice or a substitute for the medical advice, diagnosis, or treatment of a health care provider based on the health care provider's examination and assessment of a patient's specific and unique circumstances. Patients must speak with a health care provider for complete information about their health, medical questions, and treatment options, including any risks or benefits regarding use of medications. This information does not endorse any treatments or medications as safe, effective, or approved for treating a specific patient. UpToDate, Inc. and its affiliates disclaim any warranty or liability relating to this information or the use thereof.The use of this information is governed by the Terms of Use, available at https://www.[facility] [facility].com/en/know/clinical-effectiveness-terms. 2024© UpToDate, Inc. and its affiliates and/or licensors. All rights reserved.  Copyright   © 2024 UpToDate, Inc. and/or its affiliates. All rights reserved.

## 2025-05-13 NOTE — PROGRESS NOTES
"Name: Antonietta Mg      : 2005      MRN: [id number]  Encounter Provider: Jackelyn Cohen DO  Encounter Date: 2025   Encounter department: [facility]  :  Assessment & Plan  Hemorrhoids, unspecified hemorrhoid type  Anusol hc apply twice a day for 7-10 days and as needed   Discussed keeping bms regular, no straining, increased fiber in diet, increased water intake. Using wet wipes . If no change, or increased symptoms, colorectal consult    Orders:    hydrocortisone (ANUSOL-HC) 2.5 % rectal cream; Apply topically 2 (two) times a day          Depression Screening and Follow-up Plan: Patient was screened for depression during today's encounter. They screened negative with a PHQ-2 score of 0.        History of Present Illness   Complains of rectal pain - with bm, denies blood in bm or dark stools. No abdominal pain. No family history of colon cancer. Has had problems with constipation for many years. Seems like there is a bulge in the rectal area that goes in and out.       Review of Systems   Constitutional: Negative.  Negative for fatigue and fever.   HENT: Negative.     Eyes: Negative.    Respiratory: Negative.  Negative for cough.    Cardiovascular: Negative.    Gastrointestinal:  Positive for constipation and rectal pain. Negative for abdominal distention, abdominal pain, anal bleeding, blood in stool, diarrhea, nausea and vomiting.   Endocrine: Negative.    Genitourinary: Negative.    Musculoskeletal: Negative.    Skin: Negative.    Allergic/Immunologic: Negative.    Neurological: Negative.    Psychiatric/Behavioral: Negative.         Objective   /74 (BP Location: Left arm, Patient Position: Sitting, Cuff Size: Large)   Pulse 90   Temp 97.8 °F (36.6 °C) (Tympanic)   Ht 5' 4\" (1.626 m)   Wt 81.6 kg (180 lb)   SpO2 98%   BMI 30.90 kg/m²      Physical Exam  Vitals and nursing note reviewed.   Constitutional:       Appearance: She is well-developed.   HENT:      Head: " Normocephalic and atraumatic.     Cardiovascular:      Rate and Rhythm: Normal rate and regular rhythm.      Heart sounds: Normal heart sounds.   Pulmonary:      Effort: Pulmonary effort is normal.      Breath sounds: Normal breath sounds.   Abdominal:      General: Bowel sounds are normal.      Palpations: Abdomen is soft.   Genitourinary:     Rectum: Guaiac result negative.      Comments: Small external hemorrhoids, not thrombosed. soft    Skin:     General: Skin is warm and dry.     Neurological:      Mental Status: She is alert and oriented to person, place, and time.     Psychiatric:         Behavior: Behavior normal.         Thought Content: Thought content normal.         Judgment: Judgment normal.

## 2025-05-14 ENCOUNTER — TELEPHONE (OUTPATIENT)
Dept: FAMILY MEDICINE CLINIC | Facility: CLINIC | Age: 20
End: 2025-05-14

## 2025-05-14 NOTE — TELEPHONE ENCOUNTER
Reason for call:   [x] Prior Auth  [] Other:     Caller:  [x] Patient  [] Pharmacy  Name:   Address:   Callback Number:     Medication: hydrocortisone (ANUSOL-HC) 2.5 % rectal cream     Dose/Frequency: Apply topically 2 (two) times a day     Quantity: 28 g     Ordering Provider:   [x] PCP/Provider - Jackelyn Cohen, DO   [] Speciality/Provider -     Has the patient tried other medications and failed? If failed, which medications did they fail?    [] No   [] Yes -     Is the patient's insurance updated in EPIC?   [] Yes   [] No     Is a copy of the patient's insurance scanned in EPIC?   [] Yes   [] No

## 2025-05-16 ENCOUNTER — TELEPHONE (OUTPATIENT)
Dept: FAMILY MEDICINE CLINIC | Facility: CLINIC | Age: 20
End: 2025-05-16

## 2025-05-16 NOTE — TELEPHONE ENCOUNTER
Prior Authorization requested for Hydrocortisone 2.5% rectal cream. Patient's last office visit was 05/13/25. Once visit note is completed/signed please send message back to the prior authorization POD so a prior authorization can be submitted. Thank you!

## 2025-05-18 PROBLEM — J06.9 UPPER RESPIRATORY TRACT INFECTION: Status: RESOLVED | Noted: 2022-12-19 | Resolved: 2025-05-18

## 2025-05-18 PROBLEM — K64.9 HEMORRHOIDS: Status: ACTIVE | Noted: 2025-05-18

## 2025-05-18 NOTE — ASSESSMENT & PLAN NOTE
Anusol hc apply twice a day for 7-10 days and as needed   Discussed keeping bms regular, no straining, increased fiber in diet, increased water intake. Using wet wipes . If no change, or increased symptoms, colorectal consult    Orders:    hydrocortisone (ANUSOL-HC) 2.5 % rectal cream; Apply topically 2 (two) times a day

## 2025-05-23 NOTE — TELEPHONE ENCOUNTER
PA for hydrocortisone (ANUSOL-HC) 2.5 % rectal cream SUBMITTED to [place]    via    []CMM-KEY:   [x]Surescripts-Case ID #  [id number]  []Availity-Auth ID # NDC #   []Faxed to plan   []Other website   []Phone call Case ID #     [x]PA sent as URGENT    All office notes, labs and other pertaining documents and studies sent. Clinical questions answered. Awaiting determination from insurance company.     Turnaround time for your insurance to make a decision on your Prior Authorization can take 7-21 business days.

## 2025-05-23 NOTE — TELEPHONE ENCOUNTER
Pt called to check on status. OV note is completed but it appears message was not sent back to PA pod. Sending message now. Please submit urgent

## 2025-07-08 ENCOUNTER — TELEMEDICINE (OUTPATIENT)
Dept: OTHER | Facility: HOSPITAL | Age: 20
End: 2025-07-08
Payer: COMMERCIAL

## 2025-07-08 DIAGNOSIS — B00.1 HERPES LABIALIS: Primary | ICD-10-CM

## 2025-07-08 PROCEDURE — 99213 OFFICE O/P EST LOW 20 MIN: CPT | Performed by: PHYSICIAN ASSISTANT

## 2025-07-08 RX ORDER — ACYCLOVIR 50 MG/G
OINTMENT TOPICAL
Qty: 5 G | Refills: 0 | Status: SHIPPED | OUTPATIENT
Start: 2025-07-08 | End: 2025-07-12

## 2025-07-08 RX ORDER — VALACYCLOVIR HYDROCHLORIDE 1 G/1
2000 TABLET, FILM COATED ORAL 2 TIMES DAILY
Qty: 4 TABLET | Refills: 1 | Status: SHIPPED | OUTPATIENT
Start: 2025-07-08 | End: 2025-07-09

## 2025-07-08 NOTE — PROGRESS NOTES
Virtual Regular Visit  Name: Antonietta Mg      : 2005      MRN: [id number]  Encounter Provider: Shannon D Severino, PA-C  Encounter Date: 2025   Encounter department: VIRTUAL CARE       Verification of patient location:  Patient is located at Other in the following state in which I hold an active license [place] :  Assessment & Plan  Herpes labialis    Orders:    valACYclovir (VALTREX) 1,000 mg tablet; Take 2 tablets (2,000 mg total) by mouth 2 (two) times a day for 1 day    acyclovir (ZOVIRAX) 5 % ointment; Apply topically 5 (five) times a day for 4 days        Encounter provider Shannon D Severino, PA-C    The patient was identified by name and date of birth. Antonietta Mg was informed that this is a telemedicine visit and that the visit is being conducted through the Epic Embedded platform. She agrees to proceed..  My office door was closed. No one else was in the room. She acknowledged consent and understanding of privacy and security of the video platform. The patient has agreed to participate and understands they can discontinue the visit at any time.    Patient is aware this is a billable service.     History obtained from: patient and using headphones in hospital hallway  History of Present Illness     Pt reports woke up with cold sore on her R lower lip. Nothing tried so far aside from ice. No trouble eating drinking or swallowing.       Review of Systems   Constitutional:  Negative for fever.   HENT:  Positive for mouth sores. Negative for nosebleeds.    Eyes:  Negative for redness.   Respiratory:  Negative for shortness of breath.    Cardiovascular:  Negative for chest pain.   Gastrointestinal:  Negative for blood in stool.   Genitourinary:  Negative for hematuria.   Musculoskeletal:  Negative for gait problem.   Skin:  Negative for rash.   Neurological:  Negative for seizures.   Psychiatric/Behavioral:  Negative for behavioral problems.        Objective   There were no vitals taken for  this visit.    Physical Exam  Constitutional:       General: She is not in acute distress.     Appearance: Normal appearance. She is not toxic-appearing.   HENT:      Head: Normocephalic and atraumatic.      Nose: No rhinorrhea.      Mouth/Throat:      Mouth: Mucous membranes are moist.       Eyes:      Conjunctiva/sclera: Conjunctivae normal.     Pulmonary:      Effort: Pulmonary effort is normal. No respiratory distress.      Breath sounds: No wheezing (no gross audible wheeze through computer).     Musculoskeletal:      Cervical back: Normal range of motion.     Skin:     Findings: No rash (on face or neck).     Neurological:      Mental Status: She is alert.      Cranial Nerves: No dysarthria or facial asymmetry.     Psychiatric:         Mood and Affect: Mood normal.         Behavior: Behavior normal.         Visit Time  Total Visit Duration: 8 minutes not including the time spent for establishing the audio/video connection.